# Patient Record
(demographics unavailable — no encounter records)

---
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
## 2025-04-30 NOTE — PHYSICAL EXAM
[TextEntry] : General: Patient is a well-nourished, well-developed @SEX@, alert and oriented 3 in no acute distress.   HEENT: Normocephalic, atraumatic, EOMI, PERRLA. Moist mucous membranes Neck: Neck was supple without JVD. Lungs: Nonlaboured breaths. No audible stridor. Lungs with clear bilateral vesicular breath sounds to auscultation with adequate air movement and normal diaphragmatic exertion .  No wheezes rhonchi or rales were noted.   Heart: Heart was regular rate and rhythm without murmur, rubs or gallops. Extremities: Examination of the extremities revealed full range of motion of all extremities.. Neuro: Neurologically the patient was awake, alert and oriented to person, place and time.  Cranial nerves II-XII WNL.  No obvious focal neurological deficits noted.  Gait was within normal limits.  No sensory or motor deficits. Psych: Appropriate affect and mood.  Patient makes good eye contact.